# Patient Record
Sex: MALE | Race: BLACK OR AFRICAN AMERICAN | NOT HISPANIC OR LATINO | URBAN - METROPOLITAN AREA
[De-identification: names, ages, dates, MRNs, and addresses within clinical notes are randomized per-mention and may not be internally consistent; named-entity substitution may affect disease eponyms.]

---

## 2017-03-27 ENCOUNTER — OUTPATIENT (OUTPATIENT)
Dept: OUTPATIENT SERVICES | Facility: HOSPITAL | Age: 72
LOS: 1 days | Discharge: HOME | End: 2017-03-27

## 2017-06-27 DIAGNOSIS — E11.9 TYPE 2 DIABETES MELLITUS WITHOUT COMPLICATIONS: ICD-10-CM

## 2017-06-27 DIAGNOSIS — R10.11 RIGHT UPPER QUADRANT PAIN: ICD-10-CM

## 2017-06-27 DIAGNOSIS — I11.9 HYPERTENSIVE HEART DISEASE WITHOUT HEART FAILURE: ICD-10-CM

## 2017-12-06 ENCOUNTER — OUTPATIENT (OUTPATIENT)
Dept: OUTPATIENT SERVICES | Facility: HOSPITAL | Age: 72
LOS: 1 days | Discharge: HOME | End: 2017-12-06

## 2017-12-07 DIAGNOSIS — R10.11 RIGHT UPPER QUADRANT PAIN: ICD-10-CM

## 2017-12-07 DIAGNOSIS — E11.9 TYPE 2 DIABETES MELLITUS WITHOUT COMPLICATIONS: ICD-10-CM

## 2017-12-07 DIAGNOSIS — I11.9 HYPERTENSIVE HEART DISEASE WITHOUT HEART FAILURE: ICD-10-CM

## 2018-02-08 ENCOUNTER — OUTPATIENT (OUTPATIENT)
Dept: OUTPATIENT SERVICES | Facility: HOSPITAL | Age: 73
LOS: 1 days | Discharge: HOME | End: 2018-02-08

## 2018-02-09 DIAGNOSIS — E78.2 MIXED HYPERLIPIDEMIA: ICD-10-CM

## 2018-02-09 DIAGNOSIS — I11.9 HYPERTENSIVE HEART DISEASE WITHOUT HEART FAILURE: ICD-10-CM

## 2018-02-09 DIAGNOSIS — E11.9 TYPE 2 DIABETES MELLITUS WITHOUT COMPLICATIONS: ICD-10-CM

## 2019-08-07 PROBLEM — Z00.00 ENCOUNTER FOR PREVENTIVE HEALTH EXAMINATION: Status: ACTIVE | Noted: 2019-08-07

## 2019-08-23 VITALS
TEMPERATURE: 98 F | WEIGHT: 177.91 LBS | HEART RATE: 82 BPM | RESPIRATION RATE: 16 BRPM | SYSTOLIC BLOOD PRESSURE: 180 MMHG | DIASTOLIC BLOOD PRESSURE: 105 MMHG | HEIGHT: 67 IN | OXYGEN SATURATION: 98 %

## 2019-08-23 NOTE — H&P ADULT - HISTORY OF PRESENT ILLNESS
Confirm meds    Patient is a 74 y/o male with PMHx of HTN, DM, and CKD stage 3 (unknown baseline), known RBBB who presented to cardiologist Dr. Mansfield c/o SATINDER.     Patient denies CP, SOB, palpitations, syncope, PND/orthopnea or LE edema    NST 07/24/2019: EF 33%, mild to moderate lateral wall and mild anterior and inferior wall segmental perfusion abnormalities, LV is normal in size and diffuse wall motion hypokinesis.     In light of pt's risk factors, CCS --- anginal symptoms, and abnormal stress test patient is referred for cardiac catheterization with possible intervention if clinically indicated to r/o CAD Hx obtained via son Cecille Deleon joseph historian   Confirm meds    Patient is a 74 y/o male with PMHx of HTN, DM, and CKD stage 3 (unknown baseline), known RBBB who presented to cardiologist Dr. Mansfield c/o RAJAN. Patient endorses RAJAN upon walking 1 block, resolved with rest. Patient denies CP, palpitations, syncope, PND/orthopnea or LE edema. NST 07/24/2019: EF 33%, mild to moderate lateral wall and mild anterior and inferior wall segmental perfusion abnormalities, LV is normal in size and diffuse wall motion hypokinesis.     In light of pt's risk factors, CCS III anginal equivalent symptoms, and abnormal stress test patient is referred for cardiac catheterization with possible intervention if clinically indicated to r/o CAD Hx obtained via son Cecille Deleon joseph historian     Patient is a 72 y/o male with PMHx of HTN, DM, and CKD stage 3 (unknown baseline), known RBBB who presented to cardiologist Dr. Mansfield c/o RAJAN. Patient endorses RAJAN upon walking 1 block, resolved with rest. Patient denies CP, palpitations, syncope, PND/orthopnea or LE edema. NST 07/24/2019: EF 33%, mild to moderate lateral wall and mild anterior and inferior wall segmental perfusion abnormalities, LV is normal in size and diffuse wall motion hypokinesis.     In light of pt's risk factors, CCS III anginal equivalent symptoms, and abnormal stress test patient is referred for cardiac catheterization with possible intervention if clinically indicated to r/o CAD.

## 2019-08-23 NOTE — H&P ADULT - ASSESSMENT
Patient is a 72 y/o male with PMHx of HTN, DM, and CKD stage 3 (unknown baseline), known RBBB who presents to [facility] for cardiac catheterization In light of pt's risk factors, CCS III anginal equivalent symptoms, and abnormal stress test.    IV NS started at 50 cc/hr d/t patients reduced EF. Patient is currently euvolemic by exam.   Aspirin 325 mg POx 1 and Plavix 600 mg PO x 1 ordered.   Diabetes orders in place.     ASA Class III    Mallampati Class III    Risks & benefits of procedure and alternative therapy have been explained to the patient including but not limited to: allergic reaction, bleeding with possible need for blood transfusion, infection, renal and vascular compromise, limb damage, arrhythmia, stroke, vessel dissection/perforation, Myocardial infarction, emergent CABG. Informed consent obtained and in chart. Patient is a 74 y/o male with PMHx of HTN, DM, and CKD stage 3 (unknown baseline), known RBBB who presents to [facility] for cardiac catheterization In light of pt's risk factors, CCS III anginal equivalent symptoms, and abnormal stress test.    IV NS started at 50 cc/hr d/t patients reduced EF. Patient is currently euvolemic by exam.   Aspirin 325 mg POx 1 and Plavix 600 mg PO x 1 ordered.   Diabetes orders in place.     ASA Class III    Mallampati Class III    Risks & benefits of procedure and alternative therapy have been explained to the patient including but not limited to: allergic reaction, bleeding with possible need for blood transfusion, infection, renal and vascular compromise, limb damage, arrhythmia, stroke, vessel dissection/perforation, Myocardial infarction, emergent CABG. Informed consent obtained and in chart. Patient is a 72 y/o male with PMHx of HTN, DM, and CKD stage 3 (unknown baseline), known RBBB who presents to [facility] for cardiac catheterization In light of pt's risk factors, CCS III anginal equivalent symptoms, and abnormal stress test.    IV Half NS started at 75 cc/hr d/t patients reduced EF and Cr per Dr. Doll. Patient is currently euvolemic by exam.   Aspirin 325 mg POx 1 and Plavix 600 mg PO x 1 ordered.   Patient hypertensive on arrival at 189/105. Home Imdur 30 mg and Carvedilol 25 mg given. BP came down to 165/95. Dr. Doll aware.     ASA Class III    Mallampati Class III    Risks & benefits of procedure and alternative therapy have been explained to the patient including but not limited to: allergic reaction, bleeding with possible need for blood transfusion, infection, renal and vascular compromise, limb damage, arrhythmia, stroke, vessel dissection/perforation, Myocardial infarction, emergent CABG. Informed consent obtained and in chart. Patient is a 74 y/o male with PMHx of HTN, DM, and CKD stage 3 (unknown baseline), known RBBB who presents to [facility] for cardiac catheterization In light of pt's risk factors, CCS III anginal equivalent symptoms, and abnormal stress test.    IV Half NS started at 75 cc/hr d/t patients reduced EF and Cr per Dr. Doll. Patient is currently euvolemic by exam.   Aspirin 325 mg POx 1 and Plavix 600 mg PO x 1 ordered.   Patient hypertensive on arrival at 189/105, no chest pain or headaches. Home Imdur 30 mg and Carvedilol 25 mg given. BP came down to 165/95. Dr. Doll aware.     ASA Class III    Mallampati Class III    Risks & benefits of procedure and alternative therapy have been explained to the patient including but not limited to: allergic reaction, bleeding with possible need for blood transfusion, infection, renal and vascular compromise, limb damage, arrhythmia, stroke, vessel dissection/perforation, Myocardial infarction, emergent CABG. Informed consent obtained and in chart.

## 2019-08-23 NOTE — H&P ADULT - NSICDXPASTMEDICALHX_GEN_ALL_CORE_FT
PAST MEDICAL HISTORY:  CKD (chronic kidney disease) stage 3, GFR 30-59 ml/min     Diabetes     Hypertension     RBBB (right bundle branch block)

## 2019-08-23 NOTE — H&P ADULT - NSHPLABSRESULTS_GEN_ALL_CORE
13.6   7.95  )-----------( 180      ( 26 Aug 2019 14:50 )             41.9 13.6   7.95  )-----------( 180      ( 26 Aug 2019 14:50 )             41.9    08-26    141  |  106  |  12  ----------------------------<  212<H>  4.3   |  26  |  1.39<H>    Ca    9.3      26 Aug 2019 14:50    TPro  7.4  /  Alb  4.1  /  TBili  0.6  /  DBili  x   /  AST  12  /  ALT  8<L>  /  AlkPhos  62  08-26      PT/INR - ( 26 Aug 2019 14:50 )   PT: 12.0 sec;   INR: 1.06          PTT - ( 26 Aug 2019 14:50 )  PTT:32.3 sec    EKG: 13.6   7.95  )-----------( 180      ( 26 Aug 2019 14:50 )             41.9    08-26    141  |  106  |  12  ----------------------------<  212<H>  4.3   |  26  |  1.39<H>    Ca    9.3      26 Aug 2019 14:50    TPro  7.4  /  Alb  4.1  /  TBili  0.6  /  DBili  x   /  AST  12  /  ALT  8<L>  /  AlkPhos  62  08-26      PT/INR - ( 26 Aug 2019 14:50 )   PT: 12.0 sec;   INR: 1.06          PTT - ( 26 Aug 2019 14:50 )  PTT:32.3 sec    EKG: SR 80 BPM. RBBB. LVH. Poor R wave progression.

## 2019-08-26 ENCOUNTER — OUTPATIENT (OUTPATIENT)
Dept: OUTPATIENT SERVICES | Facility: HOSPITAL | Age: 74
LOS: 1 days | Discharge: NURSING, REHAB OR LONG-TERM CARE | End: 2019-08-26
Payer: MEDICARE
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
## 2019-08-26 LAB
ALBUMIN SERPL ELPH-MCNC: 4.1 G/DL — SIGNIFICANT CHANGE UP (ref 3.3–5)
ALP SERPL-CCNC: 62 U/L — SIGNIFICANT CHANGE UP (ref 40–120)
ALT FLD-CCNC: 8 U/L — LOW (ref 10–45)
ANION GAP SERPL CALC-SCNC: 9 MMOL/L — SIGNIFICANT CHANGE UP (ref 5–17)
APTT BLD: 32.3 SEC — SIGNIFICANT CHANGE UP (ref 27.5–36.3)
AST SERPL-CCNC: 12 U/L — SIGNIFICANT CHANGE UP (ref 10–40)
BASOPHILS # BLD AUTO: 0.03 K/UL — SIGNIFICANT CHANGE UP (ref 0–0.2)
BASOPHILS NFR BLD AUTO: 0.4 % — SIGNIFICANT CHANGE UP (ref 0–2)
BILIRUB SERPL-MCNC: 0.6 MG/DL — SIGNIFICANT CHANGE UP (ref 0.2–1.2)
BUN SERPL-MCNC: 12 MG/DL — SIGNIFICANT CHANGE UP (ref 7–23)
CALCIUM SERPL-MCNC: 9.3 MG/DL — SIGNIFICANT CHANGE UP (ref 8.4–10.5)
CHLORIDE SERPL-SCNC: 106 MMOL/L — SIGNIFICANT CHANGE UP (ref 96–108)
CHOLEST SERPL-MCNC: 107 MG/DL — SIGNIFICANT CHANGE UP (ref 10–199)
CK MB CFR SERPL CALC: 2.4 NG/ML — SIGNIFICANT CHANGE UP (ref 0–6.7)
CK SERPL-CCNC: 76 U/L — SIGNIFICANT CHANGE UP (ref 30–200)
CO2 SERPL-SCNC: 26 MMOL/L — SIGNIFICANT CHANGE UP (ref 22–31)
CREAT SERPL-MCNC: 1.39 MG/DL — HIGH (ref 0.5–1.3)
CRP SERPL-MCNC: 0.26 MG/DL — SIGNIFICANT CHANGE UP (ref 0–0.4)
EOSINOPHIL # BLD AUTO: 0.4 K/UL — SIGNIFICANT CHANGE UP (ref 0–0.5)
EOSINOPHIL NFR BLD AUTO: 5 % — SIGNIFICANT CHANGE UP (ref 0–6)
GLUCOSE BLDC GLUCOMTR-MCNC: 134 MG/DL — HIGH (ref 70–99)
GLUCOSE BLDC GLUCOMTR-MCNC: 179 MG/DL — HIGH (ref 70–99)
GLUCOSE SERPL-MCNC: 212 MG/DL — HIGH (ref 70–99)
HBA1C BLD-MCNC: 8.3 % — HIGH (ref 4–5.6)
HCT VFR BLD CALC: 41.9 % — SIGNIFICANT CHANGE UP (ref 39–50)
HDLC SERPL-MCNC: 47 MG/DL — SIGNIFICANT CHANGE UP
HGB BLD-MCNC: 13.6 G/DL — SIGNIFICANT CHANGE UP (ref 13–17)
IMM GRANULOCYTES NFR BLD AUTO: 0.3 % — SIGNIFICANT CHANGE UP (ref 0–1.5)
INR BLD: 1.06 — SIGNIFICANT CHANGE UP (ref 0.88–1.16)
LIPID PNL WITH DIRECT LDL SERPL: 50 MG/DL — SIGNIFICANT CHANGE UP
LYMPHOCYTES # BLD AUTO: 2.25 K/UL — SIGNIFICANT CHANGE UP (ref 1–3.3)
LYMPHOCYTES # BLD AUTO: 28.3 % — SIGNIFICANT CHANGE UP (ref 13–44)
MCHC RBC-ENTMCNC: 30.4 PG — SIGNIFICANT CHANGE UP (ref 27–34)
MCHC RBC-ENTMCNC: 32.5 GM/DL — SIGNIFICANT CHANGE UP (ref 32–36)
MCV RBC AUTO: 93.7 FL — SIGNIFICANT CHANGE UP (ref 80–100)
MONOCYTES # BLD AUTO: 0.73 K/UL — SIGNIFICANT CHANGE UP (ref 0–0.9)
MONOCYTES NFR BLD AUTO: 9.2 % — SIGNIFICANT CHANGE UP (ref 2–14)
NEUTROPHILS # BLD AUTO: 4.52 K/UL — SIGNIFICANT CHANGE UP (ref 1.8–7.4)
NEUTROPHILS NFR BLD AUTO: 56.8 % — SIGNIFICANT CHANGE UP (ref 43–77)
NRBC # BLD: 0 /100 WBCS — SIGNIFICANT CHANGE UP (ref 0–0)
PLATELET # BLD AUTO: 180 K/UL — SIGNIFICANT CHANGE UP (ref 150–400)
POTASSIUM SERPL-MCNC: 4.3 MMOL/L — SIGNIFICANT CHANGE UP (ref 3.5–5.3)
POTASSIUM SERPL-SCNC: 4.3 MMOL/L — SIGNIFICANT CHANGE UP (ref 3.5–5.3)
PROT SERPL-MCNC: 7.4 G/DL — SIGNIFICANT CHANGE UP (ref 6–8.3)
PROTHROM AB SERPL-ACNC: 12 SEC — SIGNIFICANT CHANGE UP (ref 10–12.9)
RBC # BLD: 4.47 M/UL — SIGNIFICANT CHANGE UP (ref 4.2–5.8)
RBC # FLD: 12.5 % — SIGNIFICANT CHANGE UP (ref 10.3–14.5)
SODIUM SERPL-SCNC: 141 MMOL/L — SIGNIFICANT CHANGE UP (ref 135–145)
TOTAL CHOLESTEROL/HDL RATIO MEASUREMENT: 2.3 RATIO — LOW (ref 3.4–9.6)
TRIGL SERPL-MCNC: 51 MG/DL — SIGNIFICANT CHANGE UP (ref 10–149)
WBC # BLD: 7.95 K/UL — SIGNIFICANT CHANGE UP (ref 3.8–10.5)
WBC # FLD AUTO: 7.95 K/UL — SIGNIFICANT CHANGE UP (ref 3.8–10.5)

## 2019-08-26 PROCEDURE — 93458 L HRT ARTERY/VENTRICLE ANGIO: CPT

## 2019-08-26 PROCEDURE — 86140 C-REACTIVE PROTEIN: CPT

## 2019-08-26 PROCEDURE — 93458 L HRT ARTERY/VENTRICLE ANGIO: CPT | Mod: 26

## 2019-08-26 PROCEDURE — 93005 ELECTROCARDIOGRAM TRACING: CPT

## 2019-08-26 PROCEDURE — 82553 CREATINE MB FRACTION: CPT

## 2019-08-26 PROCEDURE — 99234 HOSP IP/OBS SM DT SF/LOW 45: CPT

## 2019-08-26 PROCEDURE — 93571 IV DOP VEL&/PRESS C FLO 1ST: CPT | Mod: 26,LC

## 2019-08-26 PROCEDURE — C1760: CPT

## 2019-08-26 PROCEDURE — 93010 ELECTROCARDIOGRAM REPORT: CPT

## 2019-08-26 PROCEDURE — C1887: CPT

## 2019-08-26 PROCEDURE — 80061 LIPID PANEL: CPT

## 2019-08-26 PROCEDURE — 85730 THROMBOPLASTIN TIME PARTIAL: CPT

## 2019-08-26 PROCEDURE — 82962 GLUCOSE BLOOD TEST: CPT

## 2019-08-26 PROCEDURE — 83036 HEMOGLOBIN GLYCOSYLATED A1C: CPT

## 2019-08-26 PROCEDURE — 82550 ASSAY OF CK (CPK): CPT

## 2019-08-26 PROCEDURE — C1894: CPT

## 2019-08-26 PROCEDURE — 93571 IV DOP VEL&/PRESS C FLO 1ST: CPT | Mod: LC

## 2019-08-26 PROCEDURE — 85610 PROTHROMBIN TIME: CPT

## 2019-08-26 PROCEDURE — 85025 COMPLETE CBC W/AUTO DIFF WBC: CPT

## 2019-08-26 PROCEDURE — 80053 COMPREHEN METABOLIC PANEL: CPT

## 2019-08-26 PROCEDURE — C1769: CPT

## 2019-08-26 PROCEDURE — C1889: CPT

## 2019-08-26 RX ORDER — CLOPIDOGREL BISULFATE 75 MG/1
600 TABLET, FILM COATED ORAL ONCE
Refills: 0 | Status: COMPLETED | OUTPATIENT
Start: 2019-08-26 | End: 2019-08-26

## 2019-08-26 RX ORDER — CHLORHEXIDINE GLUCONATE 213 G/1000ML
1 SOLUTION TOPICAL ONCE
Refills: 0 | Status: DISCONTINUED | OUTPATIENT
Start: 2019-08-26 | End: 2019-08-26

## 2019-08-26 RX ORDER — ASPIRIN/CALCIUM CARB/MAGNESIUM 324 MG
325 TABLET ORAL ONCE
Refills: 0 | Status: COMPLETED | OUTPATIENT
Start: 2019-08-26 | End: 2019-08-26

## 2019-08-26 RX ORDER — SODIUM CHLORIDE 9 MG/ML
500 INJECTION, SOLUTION INTRAVENOUS
Refills: 0 | Status: DISCONTINUED | OUTPATIENT
Start: 2019-08-26 | End: 2019-08-26

## 2019-08-26 RX ADMIN — CLOPIDOGREL BISULFATE 600 MILLIGRAM(S): 75 TABLET, FILM COATED ORAL at 17:27

## 2019-08-26 RX ADMIN — SODIUM CHLORIDE 75 MILLILITER(S): 9 INJECTION, SOLUTION INTRAVENOUS at 17:08

## 2019-08-26 RX ADMIN — Medication 325 MILLIGRAM(S): at 17:27

## 2019-08-26 NOTE — PROGRESS NOTE ADULT - SUBJECTIVE AND OBJECTIVE BOX
Interventional Cardiology PA SDA Discharge Note    Patient without complaints. Ambulated and voided without difficulties    Right groin: no hematoma, no bleed, distal pulse intact.     A/P:       72 y/o male with PMHx of HTN, DM, and CKD stage 3 (unknown baseline), known RBBB who presented to cardiologist Dr. Mansfield c/o RAJAN. Patient endorses RAAJN upon walking 1 block, resolved with rest. Patient denies CP, palpitations, syncope, PND/orthopnea or LE edema. NST 07/24/2019: EF 33%, mild to moderate lateral wall and mild anterior and inferior wall segmental perfusion abnormalities, LV is normal in size and diffuse wall motion hypokinesis. Pt was referred for cardiac catheterization (8/26/2019) revealing 40-50% mid-distal LCx stenosis s/p FFR 0.93. LVEDP 19 mmHG. Patient for continued medical management. Patient stable for discharge this evening and will follow up Dr. Mansfield in 1-2 weeks.                 1.	Stable for discharge as per attending Dr. Doll after bed rest, pt voids, groin/wrist stable and 30 minutes of ambulation.  2.	Follow-up with PMD/Cardiologist Dr. Mansfield  in 1-2 weeks  3.	Discharged forms signed and copies in chart

## 2019-08-26 NOTE — PROGRESS NOTE ADULT - ATTENDING COMMENTS
Assessment: Patient personally seen and examined myself during rounds with the   Physician Assistant  ON DATE 8/26/19  Note read, including vitals, physical findings, laboratory data, and radiological reports.   Agree with above with revisions, if any included below.   - As Above  - Plan of care: continuous telemetry monitoring, frequent hemodynamic checks, daily weights, I/Os, daily 12 Lead ECG, add K and Mg to labs, cycle troponin to peak, consider Adv HF / EP / CTS / Interv. Cardio consultation if intervention is needed.

## 2021-10-24 PROBLEM — E11.9 TYPE 2 DIABETES MELLITUS WITHOUT COMPLICATIONS: Chronic | Status: ACTIVE | Noted: 2019-08-23

## 2021-10-24 PROBLEM — I45.10 UNSPECIFIED RIGHT BUNDLE-BRANCH BLOCK: Chronic | Status: ACTIVE | Noted: 2019-08-23

## 2021-10-24 PROBLEM — I10 ESSENTIAL (PRIMARY) HYPERTENSION: Chronic | Status: ACTIVE | Noted: 2019-08-23

## 2021-10-24 PROBLEM — N18.3 CHRONIC KIDNEY DISEASE, STAGE 3 (MODERATE): Chronic | Status: ACTIVE | Noted: 2019-08-23

## 2021-10-26 VITALS
HEART RATE: 79 BPM | DIASTOLIC BLOOD PRESSURE: 78 MMHG | SYSTOLIC BLOOD PRESSURE: 154 MMHG | TEMPERATURE: 99 F | WEIGHT: 179.9 LBS | OXYGEN SATURATION: 98 % | HEIGHT: 66 IN | RESPIRATION RATE: 16 BRPM

## 2021-10-26 RX ORDER — CHLORHEXIDINE GLUCONATE 213 G/1000ML
1 SOLUTION TOPICAL ONCE
Refills: 0 | Status: DISCONTINUED | OUTPATIENT
Start: 2021-10-28 | End: 2021-10-28

## 2021-10-26 NOTE — H&P ADULT - HISTORY OF PRESENT ILLNESS
SKELETON    COVID:  Cardiologist : Dr Doll   Pharmacy:   Escort:    74 y/o male with PMHx of HTN, DM, and CKD stage 3 (unknown baseline), known RBBB, non obstructive CAD (cath @ [facility] 8/26/19 m/dLcx 40-50% FFR negative,  who presented to cardiologist Dr. Doll c/o worsening RAJAN with fatigue with ambulating a few  feet occurring for _____________.     Patient denies CP, palpitations, syncope, PND/orthopnea or LE edema.     NST 10/15/21: moderate inferioposterior scarring, mild apical, anterior and lateral wall ischemia. LV enlarged with diffuse wall motion hypokinesis. EF 32%.     In light of risk factors, CCS angina class __  symptoms, abnormal NST  pt recommended for cardiac angiogram with possible intervention if clinically indicated.     cardiac catheterization  @[facility] 8/26/2019: revealing LM normal, LAD mild luminal,  40-50% mid-distal LCx stenosis s/p FFR 0.93, RCA non dominant with mild luminal. LVEDP 19 mmHG.   SKELETON    COVID:  Cardiologist : Dr Doll   Pharmacy: [facility]   Escort:    74 y/o male with PMHx of HTN, DM, and CKD stage 3 (unknown baseline), known RBBB, non obstructive CAD (cath @ [facility] 8/26/19 m/dLcx 40-50% FFR negative,  who presented to cardiologist Dr. Doll c/o worsening RAJAN with fatigue with ambulating a few  feet occurring for _____________.     Patient denies CP, palpitations, syncope, PND/orthopnea or LE edema.     NST 10/15/21: moderate inferioposterior scarring, mild apical, anterior and lateral wall ischemia. LV enlarged with diffuse wall motion hypokinesis. EF 32%.     In light of risk factors, CCS angina class __  symptoms, abnormal NST  pt recommended for cardiac angiogram with possible intervention if clinically indicated.     cardiac catheterization  @[facility] 8/26/2019: revealing LM normal, LAD mild luminal,  40-50% mid-distal LCx stenosis s/p FFR 0.93, RCA non dominant with mild luminal. LVEDP 19 mmHG.   HERBER BAI:  Cardiologist : Dr Doll   Pharmacy: [facility]   Escort: Eliel Washington [phone]    72 y/o male with PMHx of HTN, DM, and CKD stage 3 (Cr 1.39 8/2019), known RBBB, non obstructive CAD (cath @ [facility] 8/26/19 m/dLcx 40-50% FFR negative,  who presented to cardiologist Dr. Doll c/o worsening RAJAN with fatigue with ambulating a few  feet occurring for _____________.     Patient denies CP, palpitations, syncope, PND/orthopnea or LE edema.     NST 10/15/21: moderate inferioposterior scarring, mild apical, anterior and lateral wall ischemia. LV enlarged with diffuse wall motion hypokinesis. EF 32%.     In light of risk factors, CCS angina class __  symptoms, abnormal NST  pt recommended for cardiac angiogram with possible intervention if clinically indicated.     cardiac catheterization  @[facility] 8/26/2019: revealing LM normal, LAD mild luminal,  40-50% mid-distal LCx stenosis s/p FFR 0.93, RCA non dominant with mild luminal. LVEDP 19 mmHG.   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 Doll c/o worsening RAJAN with fatigue with ambulating a few  feet occurring for _____________.     Patient denies CP, palpitations, syncope, PND/orthopnea or LE edema.     NST 10/15/21: moderate inferioposterior scarring, mild apical, anterior and lateral wall ischemia. LV enlarged with diffuse wall motion hypokinesis. EF 32%.     In light of risk factors, CCS angina class __  symptoms, abnormal NST  pt recommended for cardiac angiogram with possible intervention if clinically indicated.     cardiac catheterization  @[facility] 8/26/2019: revealing LM normal, LAD mild luminal,  40-50% mid-distal LCx stenosis s/p FFR 0.93, RCA non dominant with mild luminal. LVEDP 19 mmHG.   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 Doll c/o worsening RAJAN with fatigue with ambulating a few  feet occurring for _____________.     Patient denies CP, palpitations, syncope, PND/orthopnea or LE edema.     NST 10/15/21: moderate inferioposterior scarring, mild apical, anterior and lateral wall ischemia. LV enlarged with diffuse wall motion hypokinesis. EF 32%.     In light of risk factors, CCS angina class __  symptoms, abnormal NST  pt recommended for cardiac angiogram with possible intervention if clinically indicated.     cardiac catheterization  @[facility] 8/26/2019: revealing LM normal, LAD mild luminal,  40-50% mid-distal LCx stenosis s/p FFR 0.93, RCA non dominant with mild luminal. LVEDP 19 mmHG.   COVID: swab @ [facility] (req sent)  Cardiologist : Dr Doll   Pharmacy: [facility], confirm meds   Escort: daughter     **Hydration, scheduling team emailed     72 y/o male POOR HISTORIAN with PMHx of HTN, DM II, and CKD stage 3 (Cr 1.39 8/2019), known RBBB, non obstructive CAD (cath @ [facility] 8/26/19 m/dLcx 40-50% FFR negative, who presented to cardiologist Dr. Doll c/o RAJAN with fatigue and palpitations with ambulating a few feet occurring for years,  now worsening in the past few weeks. Symptoms resolve with rest and albuterol. Pt also endorsing intermittent PND.  Patient denies CP, dizziness, syncope, orthopnea, LE edema, fever, chills, cough, melena, hematuria, sick contact or recent travel.  NST 10/15/21: moderate inferioposterior scarring, mild apical, anterior and lateral wall ischemia. LV enlarged with diffuse wall motion hypokinesis. EF 32%.     In light of risk factors, CCS angina class III symptoms, abnormal NST  pt recommended for cardiac angiogram with possible intervention if clinically indicated.     cardiac catheterization  @[facility] 8/26/2019: revealing LM normal, LAD mild luminal,  40-50% mid-distal LCx stenosis s/p FFR 0.93, RCA non dominant with mild luminal. LVEDP 19 mmHG.   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 8/26/19 m/dLcx 40-50% FFR negative, who presented to cardiologist Dr. Doll c/o RAJAN with fatigue and palpitations with ambulating a few feet occurring for years,  now worsening in the past few weeks. Symptoms resolve with rest and albuterol. Pt also endorsing intermittent PND.  Patient denies CP, dizziness, syncope, orthopnea, LE edema, fever, chills, cough, melena, hematuria, sick contact or recent travel.  NST 10/15/21: moderate inferioposterior scarring, mild apical, anterior and lateral wall ischemia. LV enlarged with diffuse wall motion hypokinesis. EF 32%.     In light of risk factors, CCS angina class III symptoms, abnormal NST  pt recommended for cardiac angiogram with possible intervention if clinically indicated.     cardiac catheterization  @[facility] 8/26/2019: revealing LM normal, LAD mild luminal,  40-50% mid-distal LCx stenosis s/p FFR 0.93, RCA non dominant with mild luminal. LVEDP 19 mmHG.

## 2021-10-26 NOTE — H&P ADULT - ASSESSMENT
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    ASA III and Mallampati III    OF NOTE:    Pt was loaded with  mg PO x1 and Plavix 600 mg PO x1 prior to the procedure.    the medications were confirmed with the pt's pharmacy.    Risks & benefits of procedure and alternative therapy have been explained to the patient including but not limited to: allergic reaction, bleeding w/possible need for blood transfusion, infection, renal and vascular compromise, limb damage, arrhythmia, stroke, vessel dissection/perforation, Myocardial infarction, emergent CABG. Informed consent obtained and in chart.   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 8/26/19 m/dLcx 40-50% FFR negative, who presented today to [facility] for recommended cardiac cath with possible intervention in light of pt's risk factors, CCS 3 anginal symptoms and abnormal NST.    ASA III and Mallampati III    OF NOTE:    Pt was loaded with  mg PO x1 and Plavix 600 mg PO x1 prior to the procedure.     Pt's Cr is 1.6 (baseline is 1.4), hydrating @ 75 cc/hr with frequent lung checks, discusses with fellow [place], he is aware, will be reaccessed in the OR.     the medications were confirmed with the pt's pharmacy.    Risks & benefits of procedure and alternative therapy have been explained to the patient including but not limited to: allergic reaction, bleeding w/possible need for blood transfusion, infection, renal and vascular compromise, limb damage, arrhythmia, stroke, vessel dissection/perforation, Myocardial infarction, emergent CABG. Informed consent obtained and in chart.

## 2021-10-28 ENCOUNTER — OUTPATIENT (OUTPATIENT)
Dept: OUTPATIENT SERVICES | Facility: HOSPITAL | Age: 76
LOS: 1 days | Discharge: HOME | End: 2021-10-28
Payer: MEDICARE
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
## 2021-10-28 LAB
A1C WITH ESTIMATED AVERAGE GLUCOSE RESULT: 6.2 % — HIGH (ref 4–5.6)
ALBUMIN SERPL ELPH-MCNC: 4.2 G/DL — SIGNIFICANT CHANGE UP (ref 3.3–5)
ALP SERPL-CCNC: 56 U/L — SIGNIFICANT CHANGE UP (ref 40–120)
ALT FLD-CCNC: 13 U/L — SIGNIFICANT CHANGE UP (ref 10–45)
ANION GAP SERPL CALC-SCNC: 10 MMOL/L — SIGNIFICANT CHANGE UP (ref 5–17)
APTT BLD: 32.1 SEC — SIGNIFICANT CHANGE UP (ref 27.5–35.5)
AST SERPL-CCNC: 23 U/L — SIGNIFICANT CHANGE UP (ref 10–40)
BASOPHILS # BLD AUTO: 0.03 K/UL — SIGNIFICANT CHANGE UP (ref 0–0.2)
BASOPHILS NFR BLD AUTO: 0.3 % — SIGNIFICANT CHANGE UP (ref 0–2)
BILIRUB SERPL-MCNC: 0.7 MG/DL — SIGNIFICANT CHANGE UP (ref 0.2–1.2)
BUN SERPL-MCNC: 13 MG/DL — SIGNIFICANT CHANGE UP (ref 7–23)
CALCIUM SERPL-MCNC: 9.5 MG/DL — SIGNIFICANT CHANGE UP (ref 8.4–10.5)
CHLORIDE SERPL-SCNC: 105 MMOL/L — SIGNIFICANT CHANGE UP (ref 96–108)
CHOLEST SERPL-MCNC: 141 MG/DL — SIGNIFICANT CHANGE UP
CK MB CFR SERPL CALC: 3 NG/ML — SIGNIFICANT CHANGE UP (ref 0–6.7)
CK SERPL-CCNC: 128 U/L — SIGNIFICANT CHANGE UP (ref 30–200)
CO2 SERPL-SCNC: 24 MMOL/L — SIGNIFICANT CHANGE UP (ref 22–31)
CREAT SERPL-MCNC: 1.6 MG/DL — HIGH (ref 0.5–1.3)
EOSINOPHIL # BLD AUTO: 0.23 K/UL — SIGNIFICANT CHANGE UP (ref 0–0.5)
EOSINOPHIL NFR BLD AUTO: 2.6 % — SIGNIFICANT CHANGE UP (ref 0–6)
ESTIMATED AVERAGE GLUCOSE: 131 MG/DL — HIGH (ref 68–114)
GLUCOSE BLDC GLUCOMTR-MCNC: 112 MG/DL — HIGH (ref 70–99)
GLUCOSE SERPL-MCNC: 151 MG/DL — HIGH (ref 70–99)
HCT VFR BLD CALC: 43.8 % — SIGNIFICANT CHANGE UP (ref 39–50)
HDLC SERPL-MCNC: 52 MG/DL — SIGNIFICANT CHANGE UP
HGB BLD-MCNC: 14.6 G/DL — SIGNIFICANT CHANGE UP (ref 13–17)
IMM GRANULOCYTES NFR BLD AUTO: 0.2 % — SIGNIFICANT CHANGE UP (ref 0–1.5)
INR BLD: 1.05 — SIGNIFICANT CHANGE UP (ref 0.88–1.16)
LIPID PNL WITH DIRECT LDL SERPL: 74 MG/DL — SIGNIFICANT CHANGE UP
LYMPHOCYTES # BLD AUTO: 3.35 K/UL — HIGH (ref 1–3.3)
LYMPHOCYTES # BLD AUTO: 38.4 % — SIGNIFICANT CHANGE UP (ref 13–44)
MCHC RBC-ENTMCNC: 30.2 PG — SIGNIFICANT CHANGE UP (ref 27–34)
MCHC RBC-ENTMCNC: 33.3 GM/DL — SIGNIFICANT CHANGE UP (ref 32–36)
MCV RBC AUTO: 90.5 FL — SIGNIFICANT CHANGE UP (ref 80–100)
MONOCYTES # BLD AUTO: 0.86 K/UL — SIGNIFICANT CHANGE UP (ref 0–0.9)
MONOCYTES NFR BLD AUTO: 9.9 % — SIGNIFICANT CHANGE UP (ref 2–14)
NEUTROPHILS # BLD AUTO: 4.24 K/UL — SIGNIFICANT CHANGE UP (ref 1.8–7.4)
NEUTROPHILS NFR BLD AUTO: 48.6 % — SIGNIFICANT CHANGE UP (ref 43–77)
NON HDL CHOLESTEROL: 89 MG/DL — SIGNIFICANT CHANGE UP
NRBC # BLD: 0 /100 WBCS — SIGNIFICANT CHANGE UP (ref 0–0)
PLATELET # BLD AUTO: 189 K/UL — SIGNIFICANT CHANGE UP (ref 150–400)
POTASSIUM SERPL-MCNC: 4.6 MMOL/L — SIGNIFICANT CHANGE UP (ref 3.5–5.3)
POTASSIUM SERPL-SCNC: 4.6 MMOL/L — SIGNIFICANT CHANGE UP (ref 3.5–5.3)
PROT SERPL-MCNC: 8.4 G/DL — HIGH (ref 6–8.3)
PROTHROM AB SERPL-ACNC: 12.6 SEC — SIGNIFICANT CHANGE UP (ref 10.6–13.6)
RBC # BLD: 4.84 M/UL — SIGNIFICANT CHANGE UP (ref 4.2–5.8)
RBC # FLD: 13.1 % — SIGNIFICANT CHANGE UP (ref 10.3–14.5)
SODIUM SERPL-SCNC: 139 MMOL/L — SIGNIFICANT CHANGE UP (ref 135–145)
TRIGL SERPL-MCNC: 73 MG/DL — SIGNIFICANT CHANGE UP
WBC # BLD: 8.73 K/UL — SIGNIFICANT CHANGE UP (ref 3.8–10.5)
WBC # FLD AUTO: 8.73 K/UL — SIGNIFICANT CHANGE UP (ref 3.8–10.5)

## 2021-10-28 PROCEDURE — C1887: CPT

## 2021-10-28 PROCEDURE — 99153 MOD SED SAME PHYS/QHP EA: CPT

## 2021-10-28 PROCEDURE — 80053 COMPREHEN METABOLIC PANEL: CPT

## 2021-10-28 PROCEDURE — 85730 THROMBOPLASTIN TIME PARTIAL: CPT

## 2021-10-28 PROCEDURE — 85025 COMPLETE CBC W/AUTO DIFF WBC: CPT

## 2021-10-28 PROCEDURE — C1769: CPT

## 2021-10-28 PROCEDURE — 93005 ELECTROCARDIOGRAM TRACING: CPT

## 2021-10-28 PROCEDURE — 83036 HEMOGLOBIN GLYCOSYLATED A1C: CPT

## 2021-10-28 PROCEDURE — 82553 CREATINE MB FRACTION: CPT

## 2021-10-28 PROCEDURE — 82550 ASSAY OF CK (CPK): CPT

## 2021-10-28 PROCEDURE — 85610 PROTHROMBIN TIME: CPT

## 2021-10-28 PROCEDURE — C1894: CPT

## 2021-10-28 PROCEDURE — 99152 MOD SED SAME PHYS/QHP 5/>YRS: CPT

## 2021-10-28 PROCEDURE — 93010 ELECTROCARDIOGRAM REPORT: CPT

## 2021-10-28 PROCEDURE — 93458 L HRT ARTERY/VENTRICLE ANGIO: CPT | Mod: 26

## 2021-10-28 PROCEDURE — 80061 LIPID PANEL: CPT

## 2021-10-28 PROCEDURE — 82962 GLUCOSE BLOOD TEST: CPT

## 2021-10-28 PROCEDURE — 93458 L HRT ARTERY/VENTRICLE ANGIO: CPT

## 2021-10-28 RX ORDER — SODIUM CHLORIDE 9 MG/ML
500 INJECTION INTRAMUSCULAR; INTRAVENOUS; SUBCUTANEOUS
Refills: 0 | Status: DISCONTINUED | OUTPATIENT
Start: 2021-10-28 | End: 2021-11-11

## 2021-10-28 RX ORDER — INSULIN LISPRO 100/ML
VIAL (ML) SUBCUTANEOUS ONCE
Refills: 0 | Status: DISCONTINUED | OUTPATIENT
Start: 2021-10-28 | End: 2021-11-11

## 2021-10-28 RX ORDER — DEXTROSE 50 % IN WATER 50 %
25 SYRINGE (ML) INTRAVENOUS ONCE
Refills: 0 | Status: DISCONTINUED | OUTPATIENT
Start: 2021-10-28 | End: 2021-11-11

## 2021-10-28 RX ORDER — DEXTROSE 50 % IN WATER 50 %
12.5 SYRINGE (ML) INTRAVENOUS ONCE
Refills: 0 | Status: DISCONTINUED | OUTPATIENT
Start: 2021-10-28 | End: 2021-11-11

## 2021-10-28 RX ORDER — LINAGLIPTIN AND METFORMIN HYDROCHLORIDE 2.5; 85 MG/1; MG/1
1 TABLET, FILM COATED ORAL
Qty: 0 | Refills: 0 | DISCHARGE

## 2021-10-28 RX ORDER — GLUCAGON INJECTION, SOLUTION 0.5 MG/.1ML
1 INJECTION, SOLUTION SUBCUTANEOUS ONCE
Refills: 0 | Status: DISCONTINUED | OUTPATIENT
Start: 2021-10-28 | End: 2021-11-11

## 2021-10-28 RX ORDER — ENOXAPARIN SODIUM 100 MG/ML
20 INJECTION SUBCUTANEOUS
Qty: 0 | Refills: 0 | DISCHARGE

## 2021-10-28 RX ORDER — SODIUM CHLORIDE 9 MG/ML
1000 INJECTION, SOLUTION INTRAVENOUS
Refills: 0 | Status: DISCONTINUED | OUTPATIENT
Start: 2021-10-28 | End: 2021-11-11

## 2021-10-28 RX ORDER — DEXTROSE 50 % IN WATER 50 %
15 SYRINGE (ML) INTRAVENOUS ONCE
Refills: 0 | Status: DISCONTINUED | OUTPATIENT
Start: 2021-10-28 | End: 2021-11-11

## 2021-10-28 RX ORDER — SODIUM CHLORIDE 9 MG/ML
500 INJECTION INTRAMUSCULAR; INTRAVENOUS; SUBCUTANEOUS
Refills: 0 | Status: DISCONTINUED | OUTPATIENT
Start: 2021-10-28 | End: 2021-10-28

## 2021-10-28 RX ORDER — CLOPIDOGREL BISULFATE 75 MG/1
600 TABLET, FILM COATED ORAL ONCE
Refills: 0 | Status: COMPLETED | OUTPATIENT
Start: 2021-10-28 | End: 2021-10-28

## 2021-10-28 RX ORDER — DICLOFENAC SODIUM 75 MG/1
1 TABLET, DELAYED RELEASE ORAL
Qty: 0 | Refills: 0 | DISCHARGE

## 2021-10-28 RX ORDER — ASPIRIN/CALCIUM CARB/MAGNESIUM 324 MG
325 TABLET ORAL ONCE
Refills: 0 | Status: COMPLETED | OUTPATIENT
Start: 2021-10-28 | End: 2021-10-28

## 2021-10-28 RX ORDER — LOSARTAN POTASSIUM 100 MG/1
1 TABLET, FILM COATED ORAL
Qty: 0 | Refills: 0 | DISCHARGE

## 2021-10-28 RX ADMIN — SODIUM CHLORIDE 75 MILLILITER(S): 9 INJECTION INTRAMUSCULAR; INTRAVENOUS; SUBCUTANEOUS at 15:04

## 2021-10-28 RX ADMIN — Medication 325 MILLIGRAM(S): at 15:04

## 2021-10-28 RX ADMIN — CLOPIDOGREL BISULFATE 600 MILLIGRAM(S): 75 TABLET, FILM COATED ORAL at 15:04

## 2021-10-28 NOTE — PROGRESS NOTE ADULT - SUBJECTIVE AND OBJECTIVE BOX
Interventional Cardiology PA SDA Discharge Note    Patient without complaints. Ambulated and voided without difficulties    Afebrile, VSS    Ext: Right Radial: no hematoma, no bleeding, dressing; C/D/I    Pulses: intact RAD/DP/PT to baseline     A/P: 73Y M, poor historian w/ PMHx HTN, DM II, CKD-III (Cr 1.39 8/2019), known RBBB, and non obstructive CAD (cath @ [facility] 8/26/19 m/dLcx 40-50% FFR negative), initially presented to cardiologist Dr. Doll c/o RAJAN with fatigue and palpitations with ambulating a few feet occurring for years, now worsening in the past few weeks. Symptoms resolve with rest and albuterol. Pt also endorsing intermittent PND. NST 10/15/21: moderate inferioposterior scarring, mild apical, anterior and lateral wall ischemia. LV enlarged with diffuse wall motion hypokinesis. EF 32%. Prior dx [facility]: LM normal, LAD mild luminal,  40-50% mid-distal LCx stenosis s/p FFR 0.93, RCA non dominant with mild luminal. LVEDP 19 mmHG. In light of risk factors, CCS angina class III symptoms, abnormal NST  pt recommended for cardiac angiogram with possible intervention if clinically indicated.   Pt now s/p dx cardiac cath 10/28: LM normal, LAD mild luminal, mALD myocardial bridge, dLAD 80% (very distally, small caliber), pLCx 40% (unchanged), OM1 40% (unchanged), RCA small co-dominant, EDP 15, access R radial TR band applied.     1.	Stable for discharge as per attending Dr. Doll after bed rest, pt voids, wrist stable and 30 minutes of ambulation.  2.	Follow-up with PMD/Cardiologist Lavon in 1-2 weeks  3.	Discharged forms signed and copies in chart

## 2021-11-03 DIAGNOSIS — I25.118 ATHEROSCLEROTIC HEART DISEASE OF NATIVE CORONARY ARTERY WITH OTHER FORMS OF ANGINA PECTORIS: ICD-10-CM

## 2021-11-03 DIAGNOSIS — R94.39 ABNORMAL RESULT OF OTHER CARDIOVASCULAR FUNCTION STUDY: ICD-10-CM

## 2023-07-19 ENCOUNTER — APPOINTMENT (OUTPATIENT)
Dept: HEART AND VASCULAR | Facility: CLINIC | Age: 78
End: 2023-07-19

## 2023-08-30 ENCOUNTER — NON-APPOINTMENT (OUTPATIENT)
Age: 78
End: 2023-08-30

## 2023-08-30 ENCOUNTER — APPOINTMENT (OUTPATIENT)
Dept: HEART AND VASCULAR | Facility: CLINIC | Age: 78
End: 2023-08-30
Payer: MEDICARE

## 2023-08-30 VITALS
TEMPERATURE: 97.2 F | HEART RATE: 75 BPM | SYSTOLIC BLOOD PRESSURE: 120 MMHG | DIASTOLIC BLOOD PRESSURE: 69 MMHG | HEIGHT: 66 IN

## 2023-08-30 DIAGNOSIS — E11.9 TYPE 2 DIABETES MELLITUS W/OUT COMPLICATIONS: ICD-10-CM

## 2023-08-30 DIAGNOSIS — I50.22 CHRONIC SYSTOLIC (CONGESTIVE) HEART FAILURE: ICD-10-CM

## 2023-08-30 PROCEDURE — 99204 OFFICE O/P NEW MOD 45 MIN: CPT | Mod: 25

## 2023-08-30 PROCEDURE — 93000 ELECTROCARDIOGRAM COMPLETE: CPT

## 2023-08-30 RX ORDER — INSULIN GLARGINE 100 [IU]/ML
100 INJECTION, SOLUTION SUBCUTANEOUS DAILY
Refills: 0 | Status: ACTIVE | COMMUNITY
Start: 2023-08-30

## 2023-08-30 RX ORDER — DAPAGLIFLOZIN 5 MG/1
5 TABLET, FILM COATED ORAL DAILY
Refills: 0 | Status: ACTIVE | COMMUNITY
Start: 2023-08-30

## 2023-09-06 PROBLEM — E11.9 DIABETES: Status: ACTIVE | Noted: 2023-08-30

## 2023-09-06 PROBLEM — I50.22 CHRONIC SYSTOLIC HEART FAILURE: Status: ACTIVE | Noted: 2023-08-30

## 2023-09-06 NOTE — PHYSICAL EXAM
[Well Developed] : well developed [Well Nourished] : well nourished [Frail] : frail [Normal] : alert and oriented, normal memory

## 2023-09-06 NOTE — END OF VISIT
[FreeTextEntry3] : I, Pratima Alfonso, am scribing for (in the presence of) Dr. Bautista the following sections: HPI, PMH,Family/social history, ROS, Physical Exam, Assessment / Plan.   I, Juan Jose Bautista, personally performed the services described in the documentation, reviewed the documentation recorded by the scribe in my presence and it accurately and completely records my words and actions.

## 2023-09-06 NOTE — REASON FOR VISIT
[Symptom and Test Evaluation] : symptom and test evaluation [Arrhythmia/ECG Abnorrmalities] : arrhythmia/ECG abnormalities [FreeTextEntry1] : Mr. Deleon is a 77 y.o. M with pmhx non-obstructive CAD (most recent cath 2021), HTN, CKD 3, DM type II, bifascicular block (RBBB and LAFB), and HF who presents for evaluation for ICD implant.   Pt reports significant exercise intolerance. Reports fatigue and hastings with even short ambulation. Pt has been on coreg and losartan for over 6 months. He does not have documentation of an echo.   He presents with his formal caregiver who states that he had a stent last year but there is no documentation of this.   Per notes: initially presented to cardiologist Dr. Doll c/o HASTINGS with fatigue and palpitations with ambulating a few feet occurring for years, now worsening in the past few weeks. Symptoms resolve with rest and albuterol. Pt also endorsing intermittent PND. NST 10/15/21: moderate inferioposterior scarring, mild apical, anterior and lateral wall ischemia. LV enlarged with diffuse wall motion hypokinesis. EF 32%. Prior dx cardiac [facility] 8/26[facility]19: LM normal, LAD mild luminal,  40-50% mid-distal LCx stenosis s/p FFR 0.93, RCA non dominant with mild luminal. LVEDP 19 mmHG. In light of risk factors, CCS angina class III symptoms, abnormal NST  pt recommended for cardiac angiogram with possible intervention if clinically indicated. Pt now s/p dx cardiac cath 10/28/21: LM normal, LAD mild luminal, mALD myocardial bridge, dLAD 80% (very distally, small caliber), pLCx 40% (unchanged), OM1 40% (unchanged), RCA small co-dominant, EDP 15

## 2023-09-07 RX ORDER — TAMSULOSIN HYDROCHLORIDE 0.4 MG/1
0.4 CAPSULE ORAL
Refills: 0 | Status: ACTIVE | COMMUNITY
Start: 2023-09-07

## 2023-09-07 RX ORDER — CLOPIDOGREL 75 MG/1
75 TABLET, FILM COATED ORAL DAILY
Refills: 0 | Status: ACTIVE | COMMUNITY
Start: 2023-09-07

## 2023-09-07 RX ORDER — FINERENONE 10 MG/1
10 TABLET, FILM COATED ORAL
Refills: 0 | Status: ACTIVE | COMMUNITY
Start: 2023-09-07

## 2023-09-07 RX ORDER — FINASTERIDE 5 MG/1
5 TABLET, FILM COATED ORAL DAILY
Refills: 0 | Status: ACTIVE | COMMUNITY
Start: 2023-09-07

## 2023-09-07 RX ORDER — ATORVASTATIN CALCIUM 10 MG/1
10 TABLET, FILM COATED ORAL DAILY
Qty: 30 | Refills: 1 | Status: ACTIVE | COMMUNITY
Start: 2023-09-07

## 2023-09-07 RX ORDER — BISACODYL 5 MG/1
5 TABLET, DELAYED RELEASE ORAL
Refills: 0 | Status: ACTIVE | COMMUNITY
Start: 2023-09-07

## 2023-09-07 RX ORDER — ALBUTEROL SULFATE 2.5 MG/3ML
(2.5 MG/3ML) SOLUTION RESPIRATORY (INHALATION)
Refills: 0 | Status: ACTIVE | COMMUNITY
Start: 2023-09-07

## 2023-09-07 RX ORDER — FUROSEMIDE 20 MG/1
20 TABLET ORAL DAILY
Refills: 0 | Status: ACTIVE | COMMUNITY
Start: 2023-09-07

## 2023-09-07 RX ORDER — INSULIN LISPRO 100 [IU]/ML
100 INJECTION, SOLUTION INTRAVENOUS; SUBCUTANEOUS
Refills: 0 | Status: ACTIVE | COMMUNITY
Start: 2023-09-07

## 2023-09-07 RX ORDER — FLUCONAZOLE 100 MG/1
100 TABLET ORAL DAILY
Refills: 0 | Status: ACTIVE | COMMUNITY
Start: 2023-09-07

## 2023-09-07 RX ORDER — LOSARTAN POTASSIUM 50 MG/1
50 TABLET, FILM COATED ORAL DAILY
Qty: 30 | Refills: 5 | Status: ACTIVE | COMMUNITY
Start: 2023-09-07

## 2023-09-07 RX ORDER — LINAGLIPTIN 5 MG/1
5 TABLET, FILM COATED ORAL DAILY
Refills: 0 | Status: ACTIVE | COMMUNITY
Start: 2023-09-07

## 2023-09-07 RX ORDER — CYPROHEPTADINE HYDROCHLORIDE 4 MG/1
4 TABLET ORAL
Refills: 0 | Status: ACTIVE | COMMUNITY
Start: 2023-09-07

## 2023-09-12 ENCOUNTER — OUTPATIENT (OUTPATIENT)
Dept: OUTPATIENT SERVICES | Facility: HOSPITAL | Age: 78
LOS: 1 days | End: 2023-09-12
Payer: MEDICARE
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
## 2023-09-12 DIAGNOSIS — I50.22 CHRONIC SYSTOLIC (CONGESTIVE) HEART FAILURE: ICD-10-CM

## 2023-09-12 PROCEDURE — 93306 TTE W/DOPPLER COMPLETE: CPT

## 2023-09-12 PROCEDURE — 93306 TTE W/DOPPLER COMPLETE: CPT | Mod: 26

## 2023-10-17 ENCOUNTER — NON-APPOINTMENT (OUTPATIENT)
Age: 78
End: 2023-10-17

## 2023-10-17 ENCOUNTER — APPOINTMENT (OUTPATIENT)
Dept: HEART AND VASCULAR | Facility: CLINIC | Age: 78
End: 2023-10-17
Payer: MEDICARE

## 2023-10-17 VITALS
TEMPERATURE: 97.1 F | WEIGHT: 189 LBS | SYSTOLIC BLOOD PRESSURE: 140 MMHG | HEIGHT: 66 IN | BODY MASS INDEX: 30.37 KG/M2 | HEART RATE: 74 BPM | DIASTOLIC BLOOD PRESSURE: 83 MMHG

## 2023-10-17 PROCEDURE — 99214 OFFICE O/P EST MOD 30 MIN: CPT | Mod: 25

## 2023-10-17 PROCEDURE — 93000 ELECTROCARDIOGRAM COMPLETE: CPT

## 2023-10-17 RX ORDER — PREGABALIN 50 MG/1
50 CAPSULE ORAL TWICE DAILY
Refills: 0 | Status: DISCONTINUED | COMMUNITY
Start: 2023-09-07 | End: 2023-10-17

## 2023-10-17 RX ORDER — OMEPRAZOLE 40 MG/1
40 CAPSULE, DELAYED RELEASE ORAL
Qty: 90 | Refills: 1 | Status: DISCONTINUED | COMMUNITY
Start: 2023-09-07 | End: 2023-10-17

## 2023-10-17 RX ORDER — ISOSORBIDE MONONITRATE 30 MG/1
30 TABLET, EXTENDED RELEASE ORAL
Qty: 30 | Refills: 0 | Status: DISCONTINUED | COMMUNITY
Start: 2023-09-07 | End: 2023-10-17

## 2023-10-17 RX ORDER — CARVEDILOL 25 MG/1
25 TABLET, FILM COATED ORAL
Qty: 30 | Refills: 3 | Status: ACTIVE | COMMUNITY
Start: 2023-09-07

## 2023-11-09 ENCOUNTER — INPATIENT (INPATIENT)
Facility: HOSPITAL | Age: 78
LOS: 0 days | Discharge: HOME | DRG: 277 | End: 2023-11-10
Attending: INTERNAL MEDICINE | Admitting: INTERNAL MEDICINE
Payer: MEDICARE

## 2023-11-09 VITALS — WEIGHT: 188.94 LBS | HEIGHT: 66 IN
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
## 2023-11-09 DIAGNOSIS — I42.8 OTHER CARDIOMYOPATHIES: ICD-10-CM

## 2023-11-09 LAB
GLUCOSE BLDC GLUCOMTR-MCNC: 138 MG/DL — HIGH (ref 70–99)
GLUCOSE BLDC GLUCOMTR-MCNC: 138 MG/DL — HIGH (ref 70–99)
GLUCOSE BLDC GLUCOMTR-MCNC: 99 MG/DL — SIGNIFICANT CHANGE UP (ref 70–99)
GLUCOSE BLDC GLUCOMTR-MCNC: 99 MG/DL — SIGNIFICANT CHANGE UP (ref 70–99)

## 2023-11-09 PROCEDURE — 33249 INSJ/RPLCMT DEFIB W/LEAD(S): CPT

## 2023-11-09 PROCEDURE — 71045 X-RAY EXAM CHEST 1 VIEW: CPT | Mod: 26

## 2023-11-09 RX ORDER — ISOSORBIDE MONONITRATE 60 MG/1
1 TABLET, EXTENDED RELEASE ORAL
Qty: 0 | Refills: 0 | DISCHARGE

## 2023-11-09 RX ORDER — INSULIN GLARGINE 100 [IU]/ML
35 INJECTION, SOLUTION SUBCUTANEOUS
Qty: 0 | Refills: 0 | DISCHARGE

## 2023-11-09 RX ORDER — ATORVASTATIN CALCIUM 80 MG/1
1 TABLET, FILM COATED ORAL
Refills: 0 | DISCHARGE

## 2023-11-09 RX ORDER — SODIUM CHLORIDE 9 MG/ML
1000 INJECTION, SOLUTION INTRAVENOUS
Refills: 0 | Status: DISCONTINUED | OUTPATIENT
Start: 2023-11-09 | End: 2023-11-10

## 2023-11-09 RX ORDER — CEFAZOLIN SODIUM 1 G
2000 VIAL (EA) INJECTION EVERY 8 HOURS
Refills: 0 | Status: COMPLETED | OUTPATIENT
Start: 2023-11-09 | End: 2023-11-10

## 2023-11-09 RX ORDER — INFLUENZA VIRUS VACCINE 15; 15; 15; 15 UG/.5ML; UG/.5ML; UG/.5ML; UG/.5ML
0.7 SUSPENSION INTRAMUSCULAR ONCE
Refills: 0 | Status: DISCONTINUED | OUTPATIENT
Start: 2023-11-09 | End: 2023-11-10

## 2023-11-09 RX ORDER — TAMSULOSIN HYDROCHLORIDE 0.4 MG/1
1 CAPSULE ORAL
Qty: 0 | Refills: 0 | DISCHARGE

## 2023-11-09 RX ORDER — TAMSULOSIN HYDROCHLORIDE 0.4 MG/1
0.4 CAPSULE ORAL AT BEDTIME
Refills: 0 | Status: DISCONTINUED | OUTPATIENT
Start: 2023-11-09 | End: 2023-11-10

## 2023-11-09 RX ORDER — DEXTROSE 50 % IN WATER 50 %
25 SYRINGE (ML) INTRAVENOUS ONCE
Refills: 0 | Status: DISCONTINUED | OUTPATIENT
Start: 2023-11-09 | End: 2023-11-10

## 2023-11-09 RX ORDER — FINERENONE 20 MG/1
2 TABLET, FILM COATED ORAL
Refills: 0 | DISCHARGE

## 2023-11-09 RX ORDER — LINAGLIPTIN 5 MG/1
1 TABLET, FILM COATED ORAL
Refills: 0 | DISCHARGE

## 2023-11-09 RX ORDER — ASPIRIN/CALCIUM CARB/MAGNESIUM 324 MG
1 TABLET ORAL
Qty: 0 | Refills: 0 | DISCHARGE

## 2023-11-09 RX ORDER — GABAPENTIN 400 MG/1
1 CAPSULE ORAL
Qty: 0 | Refills: 0 | DISCHARGE

## 2023-11-09 RX ORDER — ACETAMINOPHEN 500 MG
650 TABLET ORAL EVERY 6 HOURS
Refills: 0 | Status: DISCONTINUED | OUTPATIENT
Start: 2023-11-09 | End: 2023-11-10

## 2023-11-09 RX ORDER — LINAGLIPTIN 5 MG/1
1 TABLET, FILM COATED ORAL
Qty: 0 | Refills: 0 | DISCHARGE

## 2023-11-09 RX ORDER — DEXTROSE 50 % IN WATER 50 %
12.5 SYRINGE (ML) INTRAVENOUS ONCE
Refills: 0 | Status: DISCONTINUED | OUTPATIENT
Start: 2023-11-09 | End: 2023-11-10

## 2023-11-09 RX ORDER — LOSARTAN POTASSIUM 100 MG/1
1 TABLET, FILM COATED ORAL
Qty: 0 | Refills: 0 | DISCHARGE

## 2023-11-09 RX ORDER — INSULIN LISPRO 100/ML
VIAL (ML) SUBCUTANEOUS
Refills: 0 | Status: DISCONTINUED | OUTPATIENT
Start: 2023-11-09 | End: 2023-11-10

## 2023-11-09 RX ORDER — ALBUTEROL 90 UG/1
2 AEROSOL, METERED ORAL
Qty: 0 | Refills: 0 | DISCHARGE

## 2023-11-09 RX ORDER — DEXTROSE 50 % IN WATER 50 %
15 SYRINGE (ML) INTRAVENOUS ONCE
Refills: 0 | Status: DISCONTINUED | OUTPATIENT
Start: 2023-11-09 | End: 2023-11-10

## 2023-11-09 RX ORDER — GLUCAGON INJECTION, SOLUTION 0.5 MG/.1ML
1 INJECTION, SOLUTION SUBCUTANEOUS ONCE
Refills: 0 | Status: DISCONTINUED | OUTPATIENT
Start: 2023-11-09 | End: 2023-11-10

## 2023-11-09 RX ORDER — CARVEDILOL PHOSPHATE 80 MG/1
1 CAPSULE, EXTENDED RELEASE ORAL
Refills: 0 | DISCHARGE

## 2023-11-09 RX ORDER — AMLODIPINE BESYLATE 2.5 MG/1
1 TABLET ORAL
Qty: 0 | Refills: 0 | DISCHARGE

## 2023-11-09 RX ORDER — ISOSORBIDE MONONITRATE 60 MG/1
1 TABLET, EXTENDED RELEASE ORAL
Refills: 0 | DISCHARGE

## 2023-11-09 RX ORDER — DAPAGLIFLOZIN 10 MG/1
1 TABLET, FILM COATED ORAL
Refills: 0 | DISCHARGE

## 2023-11-09 RX ORDER — CARVEDILOL PHOSPHATE 80 MG/1
1 CAPSULE, EXTENDED RELEASE ORAL
Qty: 0 | Refills: 0 | DISCHARGE

## 2023-11-09 RX ORDER — ATORVASTATIN CALCIUM 80 MG/1
10 TABLET, FILM COATED ORAL AT BEDTIME
Refills: 0 | Status: DISCONTINUED | OUTPATIENT
Start: 2023-11-09 | End: 2023-11-10

## 2023-11-09 RX ORDER — TAMSULOSIN HYDROCHLORIDE 0.4 MG/1
1 CAPSULE ORAL
Refills: 0 | DISCHARGE

## 2023-11-09 RX ORDER — ISOSORBIDE MONONITRATE 60 MG/1
30 TABLET, EXTENDED RELEASE ORAL DAILY
Refills: 0 | Status: DISCONTINUED | OUTPATIENT
Start: 2023-11-09 | End: 2023-11-10

## 2023-11-09 RX ORDER — INSULIN LISPRO 100/ML
4 VIAL (ML) SUBCUTANEOUS
Qty: 0 | Refills: 0 | DISCHARGE

## 2023-11-09 RX ORDER — CARVEDILOL PHOSPHATE 80 MG/1
25 CAPSULE, EXTENDED RELEASE ORAL EVERY 12 HOURS
Refills: 0 | Status: DISCONTINUED | OUTPATIENT
Start: 2023-11-09 | End: 2023-11-10

## 2023-11-09 RX ORDER — CLOPIDOGREL BISULFATE 75 MG/1
1 TABLET, FILM COATED ORAL
Refills: 0 | DISCHARGE

## 2023-11-09 RX ORDER — CLOPIDOGREL BISULFATE 75 MG/1
75 TABLET, FILM COATED ORAL DAILY
Refills: 0 | Status: DISCONTINUED | OUTPATIENT
Start: 2023-11-09 | End: 2023-11-10

## 2023-11-09 RX ORDER — ATORVASTATIN CALCIUM 80 MG/1
1 TABLET, FILM COATED ORAL
Qty: 0 | Refills: 0 | DISCHARGE

## 2023-11-09 RX ADMIN — Medication 650 MILLIGRAM(S): at 22:49

## 2023-11-09 RX ADMIN — CLOPIDOGREL BISULFATE 75 MILLIGRAM(S): 75 TABLET, FILM COATED ORAL at 19:20

## 2023-11-09 RX ADMIN — ATORVASTATIN CALCIUM 10 MILLIGRAM(S): 80 TABLET, FILM COATED ORAL at 22:41

## 2023-11-09 RX ADMIN — Medication 650 MILLIGRAM(S): at 21:19

## 2023-11-09 RX ADMIN — CARVEDILOL PHOSPHATE 25 MILLIGRAM(S): 80 CAPSULE, EXTENDED RELEASE ORAL at 19:19

## 2023-11-09 RX ADMIN — TAMSULOSIN HYDROCHLORIDE 0.4 MILLIGRAM(S): 0.4 CAPSULE ORAL at 22:41

## 2023-11-09 RX ADMIN — Medication 100 MILLIGRAM(S): at 22:41

## 2023-11-09 RX ADMIN — ISOSORBIDE MONONITRATE 30 MILLIGRAM(S): 60 TABLET, EXTENDED RELEASE ORAL at 19:19

## 2023-11-09 NOTE — PATIENT PROFILE ADULT - FALL HARM RISK - HARM RISK INTERVENTIONS
Assistance with ambulation/Assistance OOB with selected safe patient handling equipment/Communicate Risk of Fall with Harm to all staff/Discuss with provider need for PT consult/Monitor gait and stability/Provide patient with walking aids - walker, cane, crutches/Reinforce activity limits and safety measures with patient and family/Sit up slowly, dangle for a short time, stand at bedside before walking/Tailored Fall Risk Interventions/Use of alarms - bed, chair and/or voice tab/Visual Cue: Yellow wristband and red socks/Bed in lowest position, wheels locked, appropriate side rails in place/Call bell, personal items and telephone in reach/Instruct patient to call for assistance before getting out of bed or chair/Non-slip footwear when patient is out of bed/[place] to call system/Physically safe environment - no spills, clutter or unnecessary equipment/Purposeful Proactive Rounding/Room/bathroom lighting operational, light cord in reach

## 2023-11-09 NOTE — H&P ADULT - ASSESSMENT
78 y.o. M with pmhx non-obstructive CAD (most recent cath 2021), HTN, CKD 3, DM type II, bifascicular block (RBBB and LAFB), and HF  presents for ICD implant for  primary prevention SCD.

## 2023-11-09 NOTE — PRE-ANESTHESIA EVALUATION ADULT - NSATTENDATTESTRD_GEN_ALL_CORE
[FreeTextEntry1] : Mr. Martinez presents today for a follow-up evaluation.  He denies exertional chest pain, shortness of breath, palpitations, lightheadedness or syncope.   The patient has been re-examined and I agree with the above assessment or I updated with my findings.

## 2023-11-09 NOTE — H&P ADULT - HISTORY OF PRESENT ILLNESS
78 y.o. M with pmhx non-obstructive CAD (most recent cath 2021), HTN, CKD 3, DM type II, bifascicular block (RBBB and LAFB), and HF  presents for ICD implant.   ?  Pt reports significant exercise intolerance. Reports fatigue and rajan with even short ambulation. Pt has been on coreg and losartan for over 6 months.. Echo 9/12/23 with EF 30/35%.  ?  Per notes: initially presented to cardiologist Dr. Doll c/o RAJAN with fatigue and palpitations with ambulating a few feet occurring for years, now worsening in the past few weeks. Symptoms resolve with rest and albuterol. Pt also endorsing intermittent PND. NST 10/15/21: moderate inferioposterior scarring, mild apical, anterior and lateral wall ischemia. LV enlarged with diffuse wall motion hypokinesis. EF 32%. [facility]: LM normal, LAD mild luminal, 40-50% mid-distal LCx stenosis s/p FFR 0.93, RCA non dominant with mild luminal. LVEDP 19 mmHG. In light of risk factors, CCS angina class III symptoms, abnormal NST pt recommended for cardiac angiogram with possible intervention if clinically indicated. Pt now s/p dx cardiac cath 10/28/21: LM normal, LAD mild luminal, mALD myocardial bridge, dLAD 80% (very distally, small caliber), pLCx 40% (unchanged), OM1 40% (unchanged), RCA small co-dominant, EDP 15

## 2023-11-09 NOTE — PRE-ANESTHESIA EVALUATION ADULT - NSANTHPEFT_GEN_ALL_CORE
General: Appearance is consistent with chronological age. No abnormal facies.  Constitutional: Alert and in no acute distress.  Eyes: The sclera and conjunctiva were normal, pupils were equal in size, round, and reactive to light and extraocular movements were intact.   ENT: The ears and nose were normal in appearance; oropharynx clear, moist mucus membranes.  Neck: The appearance of the neck was normal, no neck mass was observed. There was no jugular-venous distention.   Airway:  See Mallampati score.  Cardiovascular:  Regular rate and rhythm.  Respiratory: Unlabored breathing.  Neurological: No focal deficit, moves all extremities.  Psychiatric: Oriented to person, place, and time, insight and judgment were intact and the affect was normal.  Exercise Tolerance: Poor

## 2023-11-09 NOTE — CHART NOTE - NSCHARTNOTEFT_GEN_A_CORE
[facility]  [id number]    PROCEDURE:  Dual chamber ICD implantation    INDICATION:  Syncope in the setting of bifascicular block  Primary prevention ICD    ELECTROPHYSIOLOGIST(S):  MD Zhang Montenegro MD (Fellow)    ANESTHESIOLOGY:  Bola Kovacs MD     FINDINGS:      COMPLICATIONS:  None    RECOMMENDATIONS: [facility]  [id number]    PROCEDURE:  Dual chamber ICD implantation (Biotronik)    INDICATION:  Syncope in the setting of bifascicular block  Primary prevention ICD    ELECTROPHYSIOLOGIST(S):  MD Zhang Montenegro MD (Fellow)    ANESTHESIOLOGY:  Bola Kovacs MD     FINDINGS:  Successful dual chamber ICD implantation  Access via axillary cannulation x 2  RA lead implanted in the high right atrium  RV lead implanted in the right ventricular septum  DDD,     COMPLICATIONS:  None    RECOMMENDATIONS: [facility]  [id number]    PROCEDURE:  Dual chamber ICD implantation (Biotronik)    INDICATION:  Syncope in the setting of bifascicular block  Primary prevention ICD    ELECTROPHYSIOLOGIST(S):  MD Zhang Montenegro MD (Fellow)    ANESTHESIOLOGY:  Bola Kovacs MD     FINDINGS:  Successful dual chamber ICD implantation  Access via axillary cannulation x 2  RA lead implanted in the high right atrium  RV lead implanted in the right ventricular septum  DDD,     COMPLICATIONS:  None    RECOMMENDATIONS:  Portable CXR and ECG now  PA/Lat CXR and ECG in the AM  Keep dressing on overnight  Can shower in 24 hours, no bathing or swimming with the wound underwater for 1 month  No heavy lifting, repetitive motions with left arm for 1 month  Hold DAPT tonight; restart tomorrow AM

## 2023-11-10 ENCOUNTER — TRANSCRIPTION ENCOUNTER (OUTPATIENT)
Age: 78
End: 2023-11-10

## 2023-11-10 VITALS
DIASTOLIC BLOOD PRESSURE: 67 MMHG | RESPIRATION RATE: 18 BRPM | OXYGEN SATURATION: 98 % | HEART RATE: 86 BPM | TEMPERATURE: 99 F | SYSTOLIC BLOOD PRESSURE: 143 MMHG

## 2023-11-10 LAB
GLUCOSE BLDC GLUCOMTR-MCNC: 111 MG/DL — HIGH (ref 70–99)
GLUCOSE BLDC GLUCOMTR-MCNC: 111 MG/DL — HIGH (ref 70–99)
GLUCOSE BLDC GLUCOMTR-MCNC: 123 MG/DL — HIGH (ref 70–99)
GLUCOSE BLDC GLUCOMTR-MCNC: 123 MG/DL — HIGH (ref 70–99)
GLUCOSE BLDC GLUCOMTR-MCNC: 212 MG/DL — HIGH (ref 70–99)
GLUCOSE BLDC GLUCOMTR-MCNC: 212 MG/DL — HIGH (ref 70–99)

## 2023-11-10 PROCEDURE — 85610 PROTHROMBIN TIME: CPT

## 2023-11-10 PROCEDURE — 82962 GLUCOSE BLOOD TEST: CPT

## 2023-11-10 PROCEDURE — C1892: CPT

## 2023-11-10 PROCEDURE — 71045 X-RAY EXAM CHEST 1 VIEW: CPT

## 2023-11-10 PROCEDURE — 71046 X-RAY EXAM CHEST 2 VIEWS: CPT | Mod: 26

## 2023-11-10 PROCEDURE — C1721: CPT

## 2023-11-10 PROCEDURE — 82330 ASSAY OF CALCIUM: CPT

## 2023-11-10 PROCEDURE — 82803 BLOOD GASES ANY COMBINATION: CPT

## 2023-11-10 PROCEDURE — 85014 HEMATOCRIT: CPT

## 2023-11-10 PROCEDURE — C1777: CPT

## 2023-11-10 PROCEDURE — C1769: CPT

## 2023-11-10 PROCEDURE — C1898: CPT

## 2023-11-10 PROCEDURE — 82947 ASSAY GLUCOSE BLOOD QUANT: CPT

## 2023-11-10 PROCEDURE — 71046 X-RAY EXAM CHEST 2 VIEWS: CPT

## 2023-11-10 PROCEDURE — 84132 ASSAY OF SERUM POTASSIUM: CPT

## 2023-11-10 PROCEDURE — 84295 ASSAY OF SERUM SODIUM: CPT

## 2023-11-10 RX ADMIN — Medication 4: at 12:03

## 2023-11-10 RX ADMIN — CARVEDILOL PHOSPHATE 25 MILLIGRAM(S): 80 CAPSULE, EXTENDED RELEASE ORAL at 05:50

## 2023-11-10 RX ADMIN — Medication 650 MILLIGRAM(S): at 13:35

## 2023-11-10 RX ADMIN — ISOSORBIDE MONONITRATE 30 MILLIGRAM(S): 60 TABLET, EXTENDED RELEASE ORAL at 12:04

## 2023-11-10 RX ADMIN — Medication 650 MILLIGRAM(S): at 12:28

## 2023-11-10 RX ADMIN — Medication 100 MILLIGRAM(S): at 05:50

## 2023-11-10 RX ADMIN — CLOPIDOGREL BISULFATE 75 MILLIGRAM(S): 75 TABLET, FILM COATED ORAL at 12:04

## 2023-11-10 NOTE — DISCHARGE NOTE PROVIDER - NSDCFUADDINST_GEN_ALL_CORE_FT
- Because you have received sedation for your procedure, DO NOT: drive, operate machinery, consume alcohol, or make important decisions for 24 hours after your procedure.    - Unless instructed otherwise, leave the outer clear plastic dressing with white gauze (tegaderm) in place for 24 hours after your procedure. The glue under the dressing will take weeks to fully come off – DO Not pick, rub or remove this glue (pat dry with shower). All the stitches used during your procedure will dissolve on their own. If skin irritation is noted from around the bandage site (e.g., redness, blistering, skin breakdown, etc.), contact the Electrophysiology Office at [phone]    - Do not shower for 2 days following your implant. During this time, sponge baths are permissible providing the incision site remains dry. Complete submersion of the incision (i.e., tub baths, swimming) should be avoided for at least 4 weeks / after wound check.    o Do not use any ointments or creams (Including prescriptions) on the incision area.    - Avoid heavy lifting with the procedural side arm of > 10 pounds for 3 weeks. Also, avoid sudden jerking movements on the procedural side arm as well as avoiding upper body exercises for 4 weeks, as these movements can interfere with wound healing and may cause lead dislodgement.    - Contact the Electrophysiology Office ([phone]) or notify your physician if you notice ANY of the following:    o Bleeding or discharge from incision    o increased discomfort    o skin irritation    o signs of infection including fever > 101.0 F.    - some tenderness and swelling by the procedure site is normal. If the incision site gets harder or larger then discharge, please call us. Your skin will likely turn black and blue, and this can get worse before it gets better. As long as the area remains soft this is a normal part of the healing process.    - Please call [phone] to make a follow up appointment (wound check 2-3 weeks post discharge) if you were not given a discharge appointment    - For troubleshooting home monitoring issues please call the company who makes your device    o [employer] [place] [phone]

## 2023-11-10 NOTE — DISCHARGE NOTE PROVIDER - NSDCMRMEDTOKEN_GEN_ALL_CORE_FT
atorvastatin 10 mg oral tablet: 1 tab(s) orally once a day  carvedilol 25 mg oral tablet: 1 tab(s) orally 2 times a day  Farxiga 10 mg oral tablet: 1 tab(s) orally once a day  isosorbide mononitrate 30 mg oral tablet, extended release: 1 tab(s) orally once a day  Kerendia 10 mg oral tablet: 2 tab(s) orally once a day  Plavix 75 mg oral tablet: 1 tab(s) orally once a day  tamsulosin 0.4 mg oral capsule: 1 cap(s) orally once a day  Tradjenta 5 mg oral tablet: 1 tab(s) orally once a day

## 2023-11-10 NOTE — DISCHARGE NOTE PROVIDER - CARE PROVIDER_API CALL
[place] [place]  Cardiac Electrophysiology  [address], [address] [place] [address]  Phone: [phone]  Fax: [phone]  Follow Up Time: 2 weeks

## 2023-11-10 NOTE — DISCHARGE NOTE PROVIDER - NSDCCPCAREPLAN_GEN_ALL_CORE_FT
PRINCIPAL DISCHARGE DIAGNOSIS  Diagnosis: Dual ICD (implantable cardioverter-defibrillator) in place  Assessment and Plan of Treatment: An ICD is a Implantable cardioverter defibrillators (ICDs) are small  devices that could fit in the palm of your hand. They are  usually implanted just underneath the skin in the  chest area. ICDs help to treat dangerously fast rhythm disorders,  called ventricular tachycardia (VT) and ventricular  fibrillation (VF), in the lower chambers of the heart  (the ventricles).  You may resume your medications as usual.   The site of this implantation is covered with a glue called dermabond. This should fall off in 2 weeks. Additionally, there are absorbable sutures in place that will dissolve over time.  Call your Electrophysiologist if your site is red, swollen, or has drainage, if you develop fever, or if your arm becomes swollen. You may shower tomorrow, let soap and water run over the area but do not scrub. No baths, swimming, strenuous activity, or driving for 1 month. Do not raise your Left arm above the level of your shoulder for 1 month. If you have any questions, please call [phone].

## 2023-11-10 NOTE — DISCHARGE NOTE NURSING/CASE MANAGEMENT/SOCIAL WORK - NSDCPEFALRISK_GEN_ALL_CORE
For information on Fall & Injury Prevention, visit: [email or web address] OR  [email or web address] OR  https://www.cdc.gov/steadi/patient.html

## 2023-11-10 NOTE — DISCHARGE NOTE NURSING/CASE MANAGEMENT/SOCIAL WORK - PATIENT PORTAL LINK FT
You can access the FollowMyHealth Patient Portal offered by [facility] by registering at the following website: [email or web address]. By joining [facility]’s FollowMyHealth portal, you will also be able to view your health information using other applications (apps) compatible with our system.

## 2023-11-13 LAB
ISTAT INR: 1.1 — SIGNIFICANT CHANGE UP (ref 0.88–1.16)
ISTAT INR: 1.1 — SIGNIFICANT CHANGE UP (ref 0.88–1.16)
ISTAT PT: 13.4 SEC — HIGH (ref 10–12.9)
ISTAT PT: 13.4 SEC — HIGH (ref 10–12.9)
ISTAT VENOUS BE: 0 MMOL/L — SIGNIFICANT CHANGE UP (ref -2–3)
ISTAT VENOUS BE: 0 MMOL/L — SIGNIFICANT CHANGE UP (ref -2–3)
ISTAT VENOUS GLUCOSE: 131 MG/DL — HIGH (ref 70–99)
ISTAT VENOUS GLUCOSE: 131 MG/DL — HIGH (ref 70–99)
ISTAT VENOUS HCO3: 26 MMOL/L — SIGNIFICANT CHANGE UP (ref 23–28)
ISTAT VENOUS HCO3: 26 MMOL/L — SIGNIFICANT CHANGE UP (ref 23–28)
ISTAT VENOUS HEMATOCRIT: 52 % — HIGH (ref 39–50)
ISTAT VENOUS HEMATOCRIT: 52 % — HIGH (ref 39–50)
ISTAT VENOUS HEMOGLOBIN: 17.7 GM/DL — HIGH (ref 13–17)
ISTAT VENOUS HEMOGLOBIN: 17.7 GM/DL — HIGH (ref 13–17)
ISTAT VENOUS IONIZED CALCIUM: 1.2 MMOL/L — SIGNIFICANT CHANGE UP (ref 1.12–1.3)
ISTAT VENOUS IONIZED CALCIUM: 1.2 MMOL/L — SIGNIFICANT CHANGE UP (ref 1.12–1.3)
ISTAT VENOUS PCO2: 45 MMHG — SIGNIFICANT CHANGE UP (ref 41–51)
ISTAT VENOUS PCO2: 45 MMHG — SIGNIFICANT CHANGE UP (ref 41–51)
ISTAT VENOUS PH: 7.37 — SIGNIFICANT CHANGE UP (ref 7.31–7.41)
ISTAT VENOUS PH: 7.37 — SIGNIFICANT CHANGE UP (ref 7.31–7.41)
ISTAT VENOUS PO2: <66 MMHG — LOW (ref 35–40)
ISTAT VENOUS PO2: <66 MMHG — LOW (ref 35–40)
ISTAT VENOUS POTASSIUM: 4.4 MMOL/L — SIGNIFICANT CHANGE UP (ref 3.5–5.3)
ISTAT VENOUS POTASSIUM: 4.4 MMOL/L — SIGNIFICANT CHANGE UP (ref 3.5–5.3)
ISTAT VENOUS SO2: 58 % — SIGNIFICANT CHANGE UP
ISTAT VENOUS SO2: 58 % — SIGNIFICANT CHANGE UP
ISTAT VENOUS SODIUM: 143 MMOL/L — SIGNIFICANT CHANGE UP (ref 135–145)
ISTAT VENOUS SODIUM: 143 MMOL/L — SIGNIFICANT CHANGE UP (ref 135–145)
ISTAT VENOUS TCO2: 27 MMOL/L — SIGNIFICANT CHANGE UP (ref 22–31)
ISTAT VENOUS TCO2: 27 MMOL/L — SIGNIFICANT CHANGE UP (ref 22–31)

## 2023-11-14 DIAGNOSIS — Z79.02 LONG TERM (CURRENT) USE OF ANTITHROMBOTICS/ANTIPLATELETS: ICD-10-CM

## 2023-11-14 DIAGNOSIS — I45.2 BIFASCICULAR BLOCK: ICD-10-CM

## 2023-11-14 DIAGNOSIS — N18.30 CHRONIC KIDNEY DISEASE, STAGE 3 UNSPECIFIED: ICD-10-CM

## 2023-11-14 DIAGNOSIS — I13.0 HYPERTENSIVE HEART AND CHRONIC KIDNEY DISEASE WITH HEART FAILURE AND STAGE 1 THROUGH STAGE 4 CHRONIC KIDNEY DISEASE, OR UNSPECIFIED CHRONIC KIDNEY DISEASE: ICD-10-CM

## 2023-11-14 DIAGNOSIS — I25.10 ATHEROSCLEROTIC HEART DISEASE OF NATIVE CORONARY ARTERY WITHOUT ANGINA PECTORIS: ICD-10-CM

## 2023-11-14 DIAGNOSIS — Z79.4 LONG TERM (CURRENT) USE OF INSULIN: ICD-10-CM

## 2023-11-14 DIAGNOSIS — I42.8 OTHER CARDIOMYOPATHIES: ICD-10-CM

## 2023-11-14 DIAGNOSIS — E11.22 TYPE 2 DIABETES MELLITUS WITH DIABETIC CHRONIC KIDNEY DISEASE: ICD-10-CM

## 2023-11-14 DIAGNOSIS — I50.22 CHRONIC SYSTOLIC (CONGESTIVE) HEART FAILURE: ICD-10-CM

## 2023-11-21 ENCOUNTER — NON-APPOINTMENT (OUTPATIENT)
Age: 78
End: 2023-11-21

## 2023-11-21 ENCOUNTER — APPOINTMENT (OUTPATIENT)
Dept: HEART AND VASCULAR | Facility: CLINIC | Age: 78
End: 2023-11-21
Payer: MEDICARE

## 2023-11-21 VITALS
DIASTOLIC BLOOD PRESSURE: 62 MMHG | SYSTOLIC BLOOD PRESSURE: 114 MMHG | BODY MASS INDEX: 30.37 KG/M2 | HEIGHT: 66 IN | HEART RATE: 82 BPM | WEIGHT: 189 LBS

## 2023-11-21 DIAGNOSIS — Z95.810 PRESENCE OF AUTOMATIC (IMPLANTABLE) CARDIAC DEFIBRILLATOR: ICD-10-CM

## 2023-11-21 DIAGNOSIS — I45.2 BIFASCICULAR BLOCK: ICD-10-CM

## 2023-11-21 DIAGNOSIS — I42.8 OTHER CARDIOMYOPATHIES: ICD-10-CM

## 2023-11-21 PROCEDURE — 93282 PRGRMG EVAL IMPLANTABLE DFB: CPT

## 2023-11-30 ENCOUNTER — APPOINTMENT (OUTPATIENT)
Dept: HEART AND VASCULAR | Facility: CLINIC | Age: 78
End: 2023-11-30

## 2024-04-02 ENCOUNTER — APPOINTMENT (OUTPATIENT)
Dept: HEART AND VASCULAR | Facility: CLINIC | Age: 79
End: 2024-04-02

## 2024-04-04 ENCOUNTER — NON-APPOINTMENT (OUTPATIENT)
Age: 79
End: 2024-04-04

## 2024-04-05 ENCOUNTER — APPOINTMENT (OUTPATIENT)
Dept: HEART AND VASCULAR | Facility: CLINIC | Age: 79
End: 2024-04-05
Payer: MEDICARE

## 2024-04-05 PROCEDURE — 93295 DEV INTERROG REMOTE 1/2/MLT: CPT

## 2024-04-05 PROCEDURE — 93296 REM INTERROG EVL PM/IDS: CPT

## 2024-07-09 ENCOUNTER — APPOINTMENT (OUTPATIENT)
Dept: HEART AND VASCULAR | Facility: CLINIC | Age: 79
End: 2024-07-09

## 2024-07-09 PROCEDURE — 93295 DEV INTERROG REMOTE 1/2/MLT: CPT

## 2024-07-09 PROCEDURE — 93296 REM INTERROG EVL PM/IDS: CPT

## 2024-10-14 ENCOUNTER — APPOINTMENT (OUTPATIENT)
Dept: HEART AND VASCULAR | Facility: CLINIC | Age: 79
End: 2024-10-14
Payer: MEDICARE

## 2024-10-14 ENCOUNTER — NON-APPOINTMENT (OUTPATIENT)
Age: 79
End: 2024-10-14

## 2024-10-14 PROCEDURE — 93296 REM INTERROG EVL PM/IDS: CPT

## 2024-10-14 PROCEDURE — 93295 DEV INTERROG REMOTE 1/2/MLT: CPT

## 2024-12-02 NOTE — H&P ADULT - SKIN
Onset: 3 day.  Location / description: Patient states she is having pain when she breathes. Patient states its there with every breath but is worse with taking a deep breath. Patient states its on both sides, feels like it is coming through her back. Patient also notes that her chest feels heavy. No cough, no fevers.   Precipitating Factors: as above.   Pain Scale (1-10), 10 highest: 7/10  What improves/worsens symptoms: Deep breath worsens pain.   Symptom specific medications: albuterol- 5 times a day.   LMP : Only if pertains to symptom. N/a  Are you pregnant or breast feeding: N/a  Recent visits (last 3-4 weeks) for same reason or recent surgery: Per chart seen by pulmonary on 11/12/2024.    PLAN:  Call 911  The patient/caller refuses the advised disposition of Call 911.   The patient/caller was advised that their symptoms are serious and if they refuse the disposition, it could result in delayed care and serious health consequences. If their condition worsens, go to ER or call 911 immediately.  Patient was inquiring about possible appointment in office. Advised if declining 911 she should at least be seen in the ER. Patient verbalized understanding.     Patient/Caller does not plan to follow recommendations.   and Patient/Caller encouraged to call back if any questions or concerns.    Reason for Disposition   Chest pain lasting longer than 5 minutes and over 42 years old    Protocols used: Chest Pain-A-[place]     not examined

## 2025-01-13 ENCOUNTER — APPOINTMENT (OUTPATIENT)
Dept: HEART AND VASCULAR | Facility: CLINIC | Age: 80
End: 2025-01-13